# Patient Record
Sex: MALE | Race: WHITE
[De-identification: names, ages, dates, MRNs, and addresses within clinical notes are randomized per-mention and may not be internally consistent; named-entity substitution may affect disease eponyms.]

---

## 2019-01-16 ENCOUNTER — HOSPITAL ENCOUNTER (EMERGENCY)
Dept: HOSPITAL 53 - M ED | Age: 49
Discharge: HOME | End: 2019-01-16
Payer: COMMERCIAL

## 2019-01-16 VITALS — DIASTOLIC BLOOD PRESSURE: 90 MMHG | SYSTOLIC BLOOD PRESSURE: 135 MMHG

## 2019-01-16 VITALS — HEIGHT: 70 IN | WEIGHT: 235.5 LBS | BODY MASS INDEX: 33.71 KG/M2

## 2019-01-16 DIAGNOSIS — K57.92: Primary | ICD-10-CM

## 2019-01-16 LAB
ALBUMIN SERPL BCG-MCNC: 4.1 GM/DL (ref 3.2–5.2)
ALT SERPL W P-5'-P-CCNC: 48 U/L (ref 12–78)
AMYLASE SERPL-CCNC: 76 U/L (ref 25–115)
BASOPHILS # BLD AUTO: 0.1 10^3/UL (ref 0–0.2)
BASOPHILS NFR BLD AUTO: 0.5 % (ref 0–1)
BILIRUB CONJ SERPL-MCNC: 0.2 MG/DL (ref 0–0.2)
BILIRUB SERPL-MCNC: 0.6 MG/DL (ref 0.2–1)
BUN SERPL-MCNC: 12 MG/DL (ref 7–18)
CALCIUM SERPL-MCNC: 8.7 MG/DL (ref 8.5–10.1)
CHLORIDE SERPL-SCNC: 105 MEQ/L (ref 98–107)
CO2 SERPL-SCNC: 28 MEQ/L (ref 21–32)
CREAT SERPL-MCNC: 1.08 MG/DL (ref 0.7–1.3)
EOSINOPHIL # BLD AUTO: 0.2 10^3/UL (ref 0–0.5)
EOSINOPHIL NFR BLD AUTO: 2.3 % (ref 0–3)
GFR SERPL CREATININE-BSD FRML MDRD: > 60 ML/MIN/{1.73_M2} (ref 60–?)
GLUCOSE SERPL-MCNC: 104 MG/DL (ref 70–100)
HCT VFR BLD AUTO: 47.1 % (ref 42–52)
HGB BLD-MCNC: 16.5 G/DL (ref 13.5–17.5)
LIPASE SERPL-CCNC: 137 U/L (ref 73–393)
LYMPHOCYTES # BLD AUTO: 1.8 10^3/UL (ref 1.5–4.5)
LYMPHOCYTES NFR BLD AUTO: 18.2 % (ref 24–44)
MCH RBC QN AUTO: 30.6 PG (ref 27–33)
MCHC RBC AUTO-ENTMCNC: 35 G/DL (ref 32–36.5)
MCV RBC AUTO: 87.4 FL (ref 80–96)
MONOCYTES # BLD AUTO: 0.8 10^3/UL (ref 0–0.8)
MONOCYTES NFR BLD AUTO: 8.2 % (ref 0–5)
NEUTROPHILS # BLD AUTO: 6.9 10^3/UL (ref 1.8–7.7)
NEUTROPHILS NFR BLD AUTO: 70.5 % (ref 36–66)
PLATELET # BLD AUTO: 286 10^3/UL (ref 150–450)
POTASSIUM SERPL-SCNC: 3.9 MEQ/L (ref 3.5–5.1)
PROT SERPL-MCNC: 7.6 GM/DL (ref 6.4–8.2)
RBC # BLD AUTO: 5.39 10^6/UL (ref 4.3–6.1)
SODIUM SERPL-SCNC: 139 MEQ/L (ref 136–145)
WBC # BLD AUTO: 9.7 10^3/UL (ref 4–10)

## 2019-01-16 PROCEDURE — 83690 ASSAY OF LIPASE: CPT

## 2019-01-16 PROCEDURE — 82150 ASSAY OF AMYLASE: CPT

## 2019-01-16 PROCEDURE — 81001 URINALYSIS AUTO W/SCOPE: CPT

## 2019-01-16 PROCEDURE — 80048 BASIC METABOLIC PNL TOTAL CA: CPT

## 2019-01-16 PROCEDURE — 80076 HEPATIC FUNCTION PANEL: CPT

## 2019-01-16 PROCEDURE — 74177 CT ABD & PELVIS W/CONTRAST: CPT

## 2019-01-16 PROCEDURE — 96360 HYDRATION IV INFUSION INIT: CPT

## 2019-01-16 PROCEDURE — 96361 HYDRATE IV INFUSION ADD-ON: CPT

## 2019-01-16 PROCEDURE — 99284 EMERGENCY DEPT VISIT MOD MDM: CPT

## 2019-01-16 PROCEDURE — 85025 COMPLETE CBC W/AUTO DIFF WBC: CPT

## 2019-01-16 NOTE — REP
CT abdomen and pelvis with IV but without oral contrast:

 

History:  Left lower quadrant pain.

 

CT contrast dose:  100 mL of intravenous Isovue 370.

 

CT findings:  Digital preliminary  radiograph is unremarkable.  Bowel gas

pattern is normal.  There is coarse bilateral lower lobe plate-like atelectasis

in the lung bases on axial CT images.  There is mild diffuse fatty infiltration

of the liver.  No focal liver lesion is seen.  Liver is not enlarged.  The spleen

is unremarkable.  No adrenal lesion is seen on either side.  No pancreatic

abnormality is observed.  The gallbladder is unremarkable.  The kidneys enhance

symmetrically and are morphologically intact.  No retroperitoneal mass or

adenopathy is seen.

 

There is left colonic diverticulosis.  In the proximal sigmoid colon just beyond

the descending segment, there is a segment of mural thickening involving the

sigmoid colon with pericolonic streaking and some pericolic gutter fluid

consistent with acute diverticulitis.  No abscess or free air is seen.  A normal

appendix is seen in the right lower quadrant.  Seminal vesicles, prostate and

urinary bladder are unremarkable.

 

Impression:

 

Acute diverticulitis changes in the left colon, proximal sigmoid segment.  No

abscess or free air.

 

 

Electronically Signed by

Mars Landaverde MD 01/16/2019 07:46 P

## 2019-06-12 ENCOUNTER — HOSPITAL ENCOUNTER (OUTPATIENT)
Dept: HOSPITAL 53 - M OPP | Age: 49
Discharge: HOME | End: 2019-06-12
Attending: SURGERY
Payer: COMMERCIAL

## 2019-06-12 VITALS — WEIGHT: 235 LBS | BODY MASS INDEX: 33.64 KG/M2 | HEIGHT: 70 IN

## 2019-06-12 VITALS — SYSTOLIC BLOOD PRESSURE: 138 MMHG | DIASTOLIC BLOOD PRESSURE: 88 MMHG

## 2019-06-12 DIAGNOSIS — Z79.899: ICD-10-CM

## 2019-06-12 DIAGNOSIS — D12.3: ICD-10-CM

## 2019-06-12 DIAGNOSIS — K57.30: Primary | ICD-10-CM

## 2019-06-12 NOTE — ROOR
________________________________________________________________________________

Patient Name: Arnold Alves            Procedure Date: 6/12/2019 8:39 AM

MRN: [id number]                          Account Number: [id number]

YOB: 1970                Age: 49

Room: [facility]                            Gender: Male

Note Status: Finalized                 

________________________________________________________________________________

 

Procedure:           Colonoscopy

Indications:         Follow-up of diverticulosis of the colon

Providers:           DO Ajith Boyd MD:        MICHELLE CHAVIRA

Requesting Provider: 

Medicines:           Propofol per Anesthesia

Complications:       No immediate complications.

________________________________________________________________________________

Procedure:           Pre-Anesthesia Assessment:

                     - Prior to the procedure, a History and Physical was 

                     performed, and patient medications and allergies were 

                     reviewed. The patient is competent. The risks and 

                     benefits of the procedure and the sedation options and 

                     risks were discussed with the patient. All questions were 

                     answered and informed consent was obtained. Patient 

                     identification and proposed procedure were verified by 

                     the physician, the nurse, the anesthesiologist and the 

                     technician in the endoscopy suite. Mental Status 

                     Examination: alert and oriented. Airway Examination: 

                     normal oropharyngeal airway and neck mobility. 

                     Respiratory Examination: clear to auscultation. CV 

                     Examination: normal. Prophylactic Antibiotics: The 

                     patient does not require prophylactic antibiotics. Prior 

                     Anticoagulants: The patient has taken no previous 

                     anticoagulant or antiplatelet agents. ASA Grade 

                     Assessment: II - A patient with mild systemic disease. 

                     After reviewing the risks and benefits, the patient was 

                     deemed in satisfactory condition to undergo the 

                     procedure. The anesthesia plan was to use monitored 

                     anesthesia care (MAC). Immediately prior to 

                     administration of medications, the patient was 

                     re-assessed for adequacy to receive sedatives. The heart 

                     rate, respiratory rate, oxygen saturations, blood 

                     pressure, adequacy of pulmonary ventilation, and response 

                     to care were monitored throughout the procedure. The 

                     physical status of the patient was re-assessed after the 

                     procedure.

                     The Colonoscope was introduced through the anus and 

                     advanced to the cecum, identified by appendiceal orifice 

                     and ileocecal valve. The colonoscopy was performed 

                     without difficulty. The patient tolerated the procedure 

                     well.

                                                                                

Findings:

     A single small-mouthed diverticulum was found in the sigmoid colon.

     Two semi-pedunculated polyps were found in the transverse colon. The 

     polyps were 4 to 9 mm in size. These polyps were removed with a hot 

     snare. Resection and retrieval were complete. Estimated blood loss was 

     minimal.

     A less than 5 mm polyp was found in the transverse colon. The polyp was 

     hyperplastic. The polyp was removed with a jumbo cold forceps. Resection 

     and retrieval were complete. Estimated blood loss was minimal.

     The exam was otherwise without abnormality on direct and retroflexion 

     views.

                                                                                

Impression:          - Diverticulosis in the sigmoid colon.

                     - Two 4 to 9 mm polyps in the transverse colon, removed 

                     with a hot snare. Resected and retrieved.

                     - One less than 5 mm polyp in the transverse colon, 

                     removed with a jumbo cold forceps. Resected and retrieved.

                     - The examination was otherwise normal on direct and 

                     retroflexion views.

Recommendation:      - Patient has a contact number available for emergencies. 

                     The signs and symptoms of potential delayed complications 

                     were discussed with the patient. Return to normal 

                     activities tomorrow. Written discharge instructions were 

                     provided to the patient.

                     - Repeat colonoscopy in 3 years for surveillance of 

                     multiple polyps.

                     - Return to my office as previously scheduled.

                                                                                

 

_________________

Blayne Fowler DO

6/12/2019 9:21:56 AM

Electronically signed by Blayne Fowler DO

Number of Addenda: 0

 

Note Initiated On: 6/12/2019 8:39 AM

Estimated Blood Loss:

     Estimated blood loss was minimal.

## 2021-01-08 ENCOUNTER — HOSPITAL ENCOUNTER (OUTPATIENT)
Dept: HOSPITAL 53 - M LABSMTC | Age: 51
End: 2021-01-08
Attending: PEDIATRICS
Payer: SELF-PAY

## 2021-01-08 DIAGNOSIS — Z20.822: Primary | ICD-10-CM

## 2021-01-12 ENCOUNTER — HOSPITAL ENCOUNTER (OUTPATIENT)
Dept: HOSPITAL 53 - M LABSMTC | Age: 51
End: 2021-01-12
Attending: PEDIATRICS
Payer: SELF-PAY

## 2021-01-12 DIAGNOSIS — Z20.828: Primary | ICD-10-CM

## 2022-11-28 ENCOUNTER — HOSPITAL ENCOUNTER (OUTPATIENT)
Dept: HOSPITAL 53 - M LABSMTC | Age: 52
End: 2022-11-28
Attending: ANESTHESIOLOGY
Payer: COMMERCIAL

## 2022-11-28 DIAGNOSIS — Z01.812: Primary | ICD-10-CM

## 2022-11-28 DIAGNOSIS — Z11.52: ICD-10-CM

## 2022-12-02 ENCOUNTER — HOSPITAL ENCOUNTER (OUTPATIENT)
Dept: HOSPITAL 53 - M OPP | Age: 52
Discharge: HOME | End: 2022-12-02
Attending: SURGERY
Payer: COMMERCIAL

## 2022-12-02 VITALS — WEIGHT: 222.4 LBS | HEIGHT: 70 IN | BODY MASS INDEX: 31.84 KG/M2

## 2022-12-02 VITALS — DIASTOLIC BLOOD PRESSURE: 79 MMHG | SYSTOLIC BLOOD PRESSURE: 124 MMHG

## 2022-12-02 DIAGNOSIS — Z79.02: ICD-10-CM

## 2022-12-02 DIAGNOSIS — Z12.11: Primary | ICD-10-CM

## 2022-12-02 DIAGNOSIS — K57.30: ICD-10-CM

## 2022-12-02 DIAGNOSIS — Z86.010: ICD-10-CM

## 2022-12-02 DIAGNOSIS — I10: ICD-10-CM

## 2022-12-02 DIAGNOSIS — E03.9: ICD-10-CM

## 2022-12-02 DIAGNOSIS — Z80.42: ICD-10-CM

## 2022-12-02 DIAGNOSIS — D12.3: ICD-10-CM

## 2022-12-02 DIAGNOSIS — K52.9: ICD-10-CM

## 2022-12-02 DIAGNOSIS — Z79.899: ICD-10-CM

## 2022-12-02 DIAGNOSIS — E78.00: ICD-10-CM

## 2022-12-02 DIAGNOSIS — K64.0: ICD-10-CM

## 2022-12-02 DIAGNOSIS — M32.9: ICD-10-CM
